# Patient Record
Sex: FEMALE | Race: WHITE | NOT HISPANIC OR LATINO | Employment: OTHER | URBAN - METROPOLITAN AREA
[De-identification: names, ages, dates, MRNs, and addresses within clinical notes are randomized per-mention and may not be internally consistent; named-entity substitution may affect disease eponyms.]

---

## 2021-03-23 ENCOUNTER — OFFICE VISIT (OUTPATIENT)
Dept: ENDOCRINOLOGY | Facility: CLINIC | Age: 56
End: 2021-03-23

## 2021-03-23 VITALS
HEART RATE: 84 BPM | BODY MASS INDEX: 44.41 KG/M2 | WEIGHT: 293 LBS | TEMPERATURE: 97.1 F | HEIGHT: 68 IN | SYSTOLIC BLOOD PRESSURE: 134 MMHG | DIASTOLIC BLOOD PRESSURE: 80 MMHG | OXYGEN SATURATION: 97 %

## 2021-03-23 DIAGNOSIS — E03.9 ACQUIRED HYPOTHYROIDISM: ICD-10-CM

## 2021-03-23 DIAGNOSIS — E11.65 UNCONTROLLED TYPE 2 DIABETES MELLITUS WITH HYPERGLYCEMIA (HCC): Primary | ICD-10-CM

## 2021-03-23 LAB
EXPIRATION DATE: NORMAL
HBA1C MFR BLD: 12.3 %
Lab: NORMAL

## 2021-03-23 PROCEDURE — 99204 OFFICE O/P NEW MOD 45 MIN: CPT | Performed by: INTERNAL MEDICINE

## 2021-03-23 PROCEDURE — 83036 HEMOGLOBIN GLYCOSYLATED A1C: CPT | Performed by: INTERNAL MEDICINE

## 2021-03-23 RX ORDER — THYROID, PORCINE 240 MG/1
TABLET ORAL
COMMUNITY
Start: 2021-02-08

## 2021-03-23 RX ORDER — (INSULIN DEGLUDEC AND LIRAGLUTIDE) 100; 3.6 [IU]/ML; MG/ML
INJECTION, SOLUTION SUBCUTANEOUS
COMMUNITY
Start: 2021-03-10 | End: 2021-03-23 | Stop reason: SDUPTHER

## 2021-03-23 RX ORDER — METFORMIN HYDROCHLORIDE 500 MG/1
500 TABLET, EXTENDED RELEASE ORAL
Qty: 30 TABLET | Refills: 5 | Status: SHIPPED | OUTPATIENT
Start: 2021-03-23

## 2021-03-23 RX ORDER — SPIRONOLACTONE 100 MG/1
TABLET, FILM COATED ORAL
COMMUNITY
Start: 2021-02-08

## 2021-03-23 RX ORDER — REPAGLINIDE 1 MG/1
TABLET ORAL
COMMUNITY
Start: 2021-02-08 | End: 2021-03-23

## 2021-03-23 RX ORDER — (INSULIN DEGLUDEC AND LIRAGLUTIDE) 100; 3.6 [IU]/ML; MG/ML
50 INJECTION, SOLUTION SUBCUTANEOUS DAILY
Qty: 18 ML | Refills: 5 | Status: SHIPPED | OUTPATIENT
Start: 2021-03-23

## 2021-03-23 NOTE — PROGRESS NOTES
"     Office Note      Date: 2021  Patient Name: Mimi Serrano  MRN: [id number]  : 1965    Chief Complaint   Patient presents with   • Diabetes   • Thyroid Problem       History of Present Illness:   Mimi Serrano is a 55 y.o. female who presents for Diabetes - type 2.  She was diagnosed with gestational diabetes 21 years ago but it did not remit after delivery. She was treated with pills.  She took metformin.  She is no longer on metformin ( it made her feel bad). She did try actos as well. Then she tried invokana- yeast infection.  She has been xultophy for 3 years having tried victoza prior to that.  bg checks- daily. Usually high    Last A1c:>12      Subjective      Diabetic Complications:  Eyes: No  Kidneys: No  Feet: No  Heart: No    Diet and Exercise:  Meals per day: 2  Minutes of exercise per week: 50 mins.    Review of Systems:   Review of Systems   Constitutional: Positive for appetite change and fatigue.   HENT: Positive for ear pain.    Eyes: Negative.    Respiratory: Negative.    Cardiovascular: Positive for palpitations.   Gastrointestinal: Negative.    Genitourinary: Negative.    Skin: Negative.    Allergic/Immunologic: Negative.    Neurological: Negative.    Psychiatric/Behavioral: Positive for dysphoric mood and sleep disturbance.       The following portions of the patient's history were reviewed and updated as appropriate: allergies, current medications, past family history, past medical history, past social history, past surgical history and problem list.    Objective     Visit Vitals  /80 (BP Location: Left arm, Patient Position: Sitting, Cuff Size: Adult)   Pulse 84   Temp 97.1 °F (36.2 °C) (Infrared)   Ht 172.7 cm (68\")   Wt (!) 151 kg (332 lb)   SpO2 97%   BMI 50.48 kg/m²       Labs:    CMP  No results found for: GLUCOSE, BUN, CREATININE, EGFRIFNONA, EGFRIFAFRI, BCR, K, CO2, CALCIUM, PROTENTOTREF, LABIL2, BILIRUBIN, AST, ALT     CBC w/DIFF  No results found for: WBC, RBC, " HGB, HCT, MCV, MCH, MCHC, RDW, RDWSD, MPV, PLT, NEUTRORELPCT, LYMPHORELPCT, MONORELPCT, EOSRELPCT, BASORELPCT, AUTOIGPER, NEUTROABS, LYMPHSABS, MONOSABS, EOSABS, BASOSABS, AUTOIGNUM, NRBC    Physical Exam:  Physical Exam  Vitals reviewed.   Constitutional:       Appearance: She is obese.   Neck:      Comments: No goiter  Musculoskeletal:      Right foot: Normal range of motion. No deformity, bunion, Charcot foot, foot drop or prominent metatarsal heads.      Left foot: Normal range of motion. No deformity, bunion, Charcot foot, foot drop or prominent metatarsal heads.   Feet:      Right foot:      Protective Sensation: 10 sites tested. 10 sites sensed.      Left foot:      Protective Sensation: 10 sites tested. 10 sites sensed.      Comments: Diabetic Foot Exam Performed and Monofilament Test Performed  Neurological:      Mental Status: She is alert and oriented to person, place, and time.   Psychiatric:         Mood and Affect: Mood normal.         Thought Content: Thought content normal.         Judgment: Judgment normal.         Assessment / Plan      Assessment & Plan:  Problem List Items Addressed This Visit        Other    Uncontrolled type 2 diabetes mellitus with hyperglycemia (CMS/[facility]) - Primary    Current Assessment & Plan     Very high a1c,   Will stop glinide and add back low dose metformin. Consider bariatric surgery  Recommend ketogenic diet and exercise          Relevant Medications    metFORMIN ER (GLUCOPHAGE-XR) 500 MG 24 hr tablet    Xultophy 100-3.6 UNIT-MG/ML solution pen-injector subcutaneous pen    Other Relevant Orders    POC Glycosylated Hemoglobin (Hb A1C) (Completed)    Acquired hypothyroidism    Current Assessment & Plan     Clinically euthyroid. Will check levles   And adjust as indicated          Relevant Medications    [place] Thyroid 240 MG tablet    Other Relevant Orders    TSH           Demarco Devries MD   03/23/2021

## 2021-03-23 NOTE — ASSESSMENT & PLAN NOTE
Very high a1c,   Will stop glinide and add back low dose metformin. Consider bariatric surgery  Recommend ketogenic diet and exercise

## 2021-04-15 ENCOUNTER — TELEPHONE (OUTPATIENT)
Dept: ENDOCRINOLOGY | Facility: CLINIC | Age: 56
End: 2021-04-15

## 2021-04-15 NOTE — TELEPHONE ENCOUNTER
Patient would like a call about her TSH labs from [employer] to discuss them. However, she would need a call back tomorrow because she's about to go into another appt. Please advise.

## 2023-11-29 ENCOUNTER — OFFICE VISIT (OUTPATIENT)
Dept: ENDOCRINOLOGY | Facility: CLINIC | Age: 58
End: 2023-11-29
Payer: COMMERCIAL

## 2023-11-29 VITALS
OXYGEN SATURATION: 97 % | BODY MASS INDEX: 44.41 KG/M2 | DIASTOLIC BLOOD PRESSURE: 84 MMHG | HEART RATE: 80 BPM | WEIGHT: 293 LBS | HEIGHT: 68 IN | SYSTOLIC BLOOD PRESSURE: 130 MMHG

## 2023-11-29 DIAGNOSIS — E03.9 HYPOTHYROIDISM, UNSPECIFIED TYPE: ICD-10-CM

## 2023-11-29 DIAGNOSIS — E11.65 UNCONTROLLED TYPE 2 DIABETES MELLITUS WITH HYPERGLYCEMIA: Primary | ICD-10-CM

## 2023-11-29 LAB
ALBUMIN UR-MCNC: 3.4 MG/DL
CREAT UR-MCNC: 111.9 MG/DL
EXPIRATION DATE: ABNORMAL
EXPIRATION DATE: ABNORMAL
GLUCOSE BLDC GLUCOMTR-MCNC: 285 MG/DL (ref 70–130)
HBA1C MFR BLD: 11.6 % (ref 4.5–5.7)
Lab: ABNORMAL
Lab: ABNORMAL
MICROALBUMIN/CREAT UR: 30.4 MG/G (ref 0–29)

## 2023-11-29 PROCEDURE — 82043 UR ALBUMIN QUANTITATIVE: CPT | Performed by: INTERNAL MEDICINE

## 2023-11-29 PROCEDURE — 84443 ASSAY THYROID STIM HORMONE: CPT | Performed by: INTERNAL MEDICINE

## 2023-11-29 PROCEDURE — 82570 ASSAY OF URINE CREATININE: CPT | Performed by: INTERNAL MEDICINE

## 2023-11-29 PROCEDURE — 80061 LIPID PANEL: CPT | Performed by: INTERNAL MEDICINE

## 2023-11-29 PROCEDURE — 80053 COMPREHEN METABOLIC PANEL: CPT | Performed by: INTERNAL MEDICINE

## 2023-11-29 PROCEDURE — 84439 ASSAY OF FREE THYROXINE: CPT | Performed by: INTERNAL MEDICINE

## 2023-11-29 RX ORDER — TIRZEPATIDE 2.5 MG/.5ML
2.5 INJECTION, SOLUTION SUBCUTANEOUS WEEKLY
Qty: 2 ML | Refills: 3 | Status: SHIPPED | OUTPATIENT
Start: 2023-11-29

## 2023-11-29 RX ORDER — LEVOTHYROXINE SODIUM 112 UG/1
112 TABLET ORAL DAILY
COMMUNITY
End: 2023-12-01

## 2023-11-29 RX ORDER — GLIPIZIDE 10 MG/1
10 TABLET, FILM COATED, EXTENDED RELEASE ORAL DAILY
COMMUNITY

## 2023-11-29 RX ORDER — INSULIN DEGLUDEC 200 U/ML
INJECTION, SOLUTION SUBCUTANEOUS
Qty: 15 ML | Refills: 5 | Status: SHIPPED | OUTPATIENT
Start: 2023-11-29

## 2023-11-29 NOTE — PROGRESS NOTES
"Chief Complaint   Patient presents with    Hypothyroidism    Diabetes        HPI   Mimi Serrano is a 58 y.o. female had concerns including Hypothyroidism and Diabetes.      Patient previously seen in this office, last in 2021.  Patient reports multiple medication changes since her last visit with this office, primarily due to issues with insurance.    Regarding hypothyroidism, patient changed from [place] Thyroid to levothyroxine given lack of insurance coverage.  Patient reports that her PCP actually stopped her thyroid hormone completely earlier this year due to abnormal labs and TSH shyam to greater than 100.  She was subsequently started on levothyroxine at a dose of 75 mcg daily and this is now been increased to 112 mcg daily.  She has not had repeat labs since this dose change and is agreeable to doing this today.  She does report concern for ongoing weight gain and fatigue.  Patient also reports that she has had an insurance change and is interested in transitioning back to [place] Thyroid.  She denies any personal history of cardiac arrhythmia or heart disease.    Regarding diabetes, patient is currently taking glipizide 10 mg extended release daily and metformin 500 mg extended release daily.  She states that Xultophy was discontinued due to lack of insurance coverage.  Her PCP did do a trial of Ozempic earlier this summer which resulted in projectile vomiting.  Patient reports eye exam is up-to-date from within the past year, she had cataract surgery last Wednesday.      The following portions of the patient's history were reviewed and updated as appropriate: allergies, current medications, and past social history.    Review of Systems   Constitutional:  Positive for fatigue and unexpected weight gain.        /84 (BP Location: Right arm, Patient Position: Sitting, Cuff Size: Adult)   Pulse 80   Ht 172.7 cm (68\")   Wt (!) 139 kg (306 lb)   SpO2 97%   BMI 46.53 kg/m²      Physical Exam    "   Constitutional:  well developed; well nourished  no acute distress  obese - Body mass index is 46.53 kg/m².   ENT/Thyroid: not examined   Eyes: Conjunctiva: clear   Respiratory:  breathing is unlabored  clear to auscultation bilaterally   Cardiovascular:  regular rate and rhythm   Chest:  Not performed.   Abdomen: Not performed.   : Not performed.   Musculoskeletal: Not performed   Skin: not performed.   Neuro: mental status, speech normal   Psych: mood and affect are within normal limits       Labs/Imaging  Glucose:  Lab Results   Component Value Date    POCGLU 285 (A) 11/29/2023     HbA1c:  Lab Results   Component Value Date    HGBA1C 11.6 (A) 11/29/2023    HGBA1C 12.3 03/23/2021     Labs dated 7/21/2023  TSH 8.38  Free T4 0.9        Diagnoses and all orders for this visit:    1. Uncontrolled type 2 diabetes mellitus with hyperglycemia (Primary)  -     POC Glucose, Blood  -     POC Glycosylated Hemoglobin (Hb A1C)  -     Microalbumin / Creatinine Urine Ratio - Urine, Clean Catch; Future  -     Comprehensive Metabolic Panel; Future  -     Lipid Panel; Future  Diabetes is poorly controlled with hemoglobin A1c 11.6%, glucose elevated during office visit  Reviewed potential adverse effects of poorly controlled diabetes.  Patient is at elevated risk of complications.  Discussed options for improving glycemic control.  Recommended initiation of basal insulin given degree of hyperglycemia.  Patient was agreeable.  Plan to start Tresiba or formulary equivalent 14 units daily. Patient advised to check glucose every morning before eating. If glucose consistently above 150, patient instructed to increase by 2 units every 3 days until morning sugar (before eating) is less than 150. Patient advised not to exceed 40 units daily. If patient experiencing hypoglycemia (glucose <70), patient should stop titration and contact clinic.   Patient also reports interest in resuming medication which may aid in weight loss.  She did  not tolerate Ozempic.  She previously took Victoza without issue.  We discussed potential trial of Mounjaro.  Potential adverse effects including nausea, worsening gastric reflux were reviewed.  Discussed risk of pancreatitis and data regarding medullary thyroid cancer.  Patient denies any personal or family history of medullary thyroid cancer.  Start Mounjaro 2.5 mg weekly  Continue glipizide extended release 10 mg daily and metformin 500 mg extended release daily  Discussed potential for long-term complications with uncontrolled diabetes including nephropathy, neuropathy, retinopathy, increased risk for cardiac disease.  Discussed the role of diet and exercise in the management of diabetes.   Updating screening labs today    2. Hypothyroidism, unspecified type  -     TSH; Future  -     T4, Free; Future  Patient is currently taking levothyroxine 112 mcg daily.  She has not had labs following recent dose change.  She does report interest in transition back to [facility].  We discussed potential adverse effects of T3 containing therapy.  We will update labs today and determine appropriate dose conversion.    Other orders  -     Tirzepatide (Mounjaro) 2.5 MG/0.5ML solution pen-injector; Inject 0.5 mL under the skin into the appropriate area as directed 1 (One) Time Per Week.  Dispense: 2 mL; Refill: 3  -     Insulin Degludec (Tresiba FlexTouch) 200 UNIT/ML solution pen-injector pen injection; Start 14 units daily and titrate per instructions, MDD 40 units  Dispense: 15 mL; Refill: 5         No follow-ups on file. The patient was instructed to contact the clinic with any interval questions or concerns.    Electronically signed by: Kristen Krause MD   Endocrinologist    Dictated Utilizing Dragon Dictation

## 2023-11-30 ENCOUNTER — PRIOR AUTHORIZATION (OUTPATIENT)
Dept: ENDOCRINOLOGY | Facility: CLINIC | Age: 58
End: 2023-11-30
Payer: COMMERCIAL

## 2023-11-30 LAB
ALBUMIN SERPL-MCNC: 4.1 G/DL (ref 3.5–5.2)
ALBUMIN/GLOB SERPL: 1.4 G/DL
ALP SERPL-CCNC: 126 U/L (ref 39–117)
ALT SERPL W P-5'-P-CCNC: 50 U/L (ref 1–33)
ANION GAP SERPL CALCULATED.3IONS-SCNC: 12.1 MMOL/L (ref 5–15)
AST SERPL-CCNC: 46 U/L (ref 1–32)
BILIRUB SERPL-MCNC: 1.1 MG/DL (ref 0–1.2)
BUN SERPL-MCNC: 19 MG/DL (ref 6–20)
BUN/CREAT SERPL: 18.1 (ref 7–25)
CALCIUM SPEC-SCNC: 9.5 MG/DL (ref 8.6–10.5)
CHLORIDE SERPL-SCNC: 101 MMOL/L (ref 98–107)
CHOLEST SERPL-MCNC: 202 MG/DL (ref 0–200)
CO2 SERPL-SCNC: 25.9 MMOL/L (ref 22–29)
CREAT SERPL-MCNC: 1.05 MG/DL (ref 0.57–1)
EGFRCR SERPLBLD CKD-EPI 2021: 62.5 ML/MIN/1.73
GLOBULIN UR ELPH-MCNC: 2.9 GM/DL
GLUCOSE SERPL-MCNC: 265 MG/DL (ref 65–99)
HDLC SERPL-MCNC: 54 MG/DL (ref 40–60)
LDLC SERPL CALC-MCNC: 121 MG/DL (ref 0–100)
LDLC/HDLC SERPL: 2.17 {RATIO}
POTASSIUM SERPL-SCNC: 4.8 MMOL/L (ref 3.5–5.2)
PROT SERPL-MCNC: 7 G/DL (ref 6–8.5)
SODIUM SERPL-SCNC: 139 MMOL/L (ref 136–145)
T4 FREE SERPL-MCNC: 1.05 NG/DL (ref 0.93–1.7)
TRIGL SERPL-MCNC: 155 MG/DL (ref 0–150)
TSH SERPL DL<=0.05 MIU/L-ACNC: 7.11 UIU/ML (ref 0.27–4.2)
VLDLC SERPL-MCNC: 27 MG/DL (ref 5–40)

## 2023-11-30 NOTE — TELEPHONE ENCOUNTER
FRANCO JOE (Key: [id number])  Rx #: [id number]  Need Help? Call us at [phone]  Outcome Additional Information Required  Your PA has been resolved, no additional PA is required. For further inquiries please contact the number on the back of the member prescription card. (Message [phone])  Drug Mounjaro 2.5MG/0.5ML pen-injectors ePA cloud logo  Form [facility] Electronic PA Form (2017 NCPDP)  Original Claim Info 39

## 2023-12-01 RX ORDER — THYROID 90 MG/1
90 TABLET ORAL DAILY
Qty: 30 TABLET | Refills: 3 | Status: SHIPPED | OUTPATIENT
Start: 2023-12-01 | End: 2024-11-30

## 2023-12-06 ENCOUNTER — PRIOR AUTHORIZATION (OUTPATIENT)
Dept: ENDOCRINOLOGY | Facility: CLINIC | Age: 58
End: 2023-12-06
Payer: COMMERCIAL

## 2023-12-06 NOTE — TELEPHONE ENCOUNTER
Please contact patient.  [place] Thyroid is not covered by her insurance.  Please let me know how she would like to move forward.  It does look like her insurance may cover synthetic T3 (liothyronine) we could try this in combination with levothyroxine.  Example of this regimen would be 112 mcg of levothyroxine and 5 mcg of levothyroxine.  Alternatively, we could increase her dose of levothyroxine.

## 2023-12-06 NOTE — TELEPHONE ENCOUNTER
FRANCO JOE (Key: [id number])  PA Case ID #: [id number]  Rx #: [id number]  Need Help? Call us at [phone]  Outcome Denied today  Your PA request has been denied. Additional information will be provided in the denial communication. (Message [phone])  Drug [place] Thyroid 90MG tablets  Form [employer] Electronic PA Form (2017 NCPDP)  Original Claim Info 70,MR    You do not meet the requirements of your plan.  Your plan covers this drug when your doctor provides documentation that you meet any of  these conditions:  -You have a clinical condition for which there is no appropriate formulary alternative  -You need a form of the drug that is not on the formulary  -You tried the required number of preferred formulary alternatives on your formulary first.  These drugs did not work for you or you cannot take them.  Your request has been denied based on the information we have.  Formulary alternative(s) are levothyroxine tablet, liothyronine. Requirement: 3 in a class  with 3 or more alternatives, 2 in a class with 2 alternatives, or 1 in a class with only 1  alternative. Please refer to your plan documents for a complete list of alternatives.  Note: Formulary alternatives may require a prior authorization. Your prescriber will be  responsible for determining what alternative is appropriate for you.    Please advise

## 2023-12-07 NOTE — TELEPHONE ENCOUNTER
Called the pt and told her the armour was denied. She is going to the phar and verify they have the correct ins info.    She is going to call back with what MM needs to do.    She verbalized understanding to the denial.    Also went over the labs results. She verbalized understanding to those.

## 2023-12-08 ENCOUNTER — TELEPHONE (OUTPATIENT)
Dept: ENDOCRINOLOGY | Facility: CLINIC | Age: 58
End: 2023-12-08

## 2024-01-09 ENCOUNTER — PRIOR AUTHORIZATION (OUTPATIENT)
Dept: ENDOCRINOLOGY | Facility: CLINIC | Age: 59
End: 2024-01-09
Payer: COMMERCIAL

## 2024-02-07 ENCOUNTER — TELEPHONE (OUTPATIENT)
Dept: ENDOCRINOLOGY | Facility: CLINIC | Age: 59
End: 2024-02-07
Payer: COMMERCIAL

## 2024-02-07 NOTE — TELEPHONE ENCOUNTER
Caller: Mimi Serrano    Relationship to patient: Self    Best call back number: [phone]    Patient is needing: PATIENT WAS GIVEN MOUNJARO WITH LOWEST DOSE, SHE IS NEEDING TO RAISE DOSAGE AND GET NEW PRESCRIPTION FOR THAT     [facility] - [address] - [phone] [facility] [phone] FX [[id number]]

## 2024-02-08 NOTE — TELEPHONE ENCOUNTER
Called the and she is wanting to increase the Mounjaro. She is not having any issues with the low dose.    Script is pending.    Last office visit with prescribing clinician: 11/29/2023       Next office visit with prescribing clinician: 5/1/2024     {

## 2024-05-01 ENCOUNTER — OFFICE VISIT (OUTPATIENT)
Dept: ENDOCRINOLOGY | Facility: CLINIC | Age: 59
End: 2024-05-01
Payer: COMMERCIAL

## 2024-05-01 VITALS
OXYGEN SATURATION: 97 % | DIASTOLIC BLOOD PRESSURE: 80 MMHG | HEART RATE: 72 BPM | HEIGHT: 68 IN | WEIGHT: 293 LBS | BODY MASS INDEX: 44.41 KG/M2 | SYSTOLIC BLOOD PRESSURE: 130 MMHG

## 2024-05-01 DIAGNOSIS — E03.9 HYPOTHYROIDISM, UNSPECIFIED TYPE: ICD-10-CM

## 2024-05-01 DIAGNOSIS — E11.65 UNCONTROLLED TYPE 2 DIABETES MELLITUS WITH HYPERGLYCEMIA: Primary | ICD-10-CM

## 2024-05-01 LAB
EXPIRATION DATE: ABNORMAL
EXPIRATION DATE: ABNORMAL
GLUCOSE BLDC GLUCOMTR-MCNC: 270 MG/DL (ref 70–130)
HBA1C MFR BLD: 9.4 % (ref 4.5–5.7)
Lab: ABNORMAL
Lab: ABNORMAL
T3FREE SERPL-MCNC: 2.61 PG/ML (ref 2–4.4)
T4 FREE SERPL-MCNC: 0.74 NG/DL (ref 0.93–1.7)
TSH SERPL DL<=0.05 MIU/L-ACNC: 12.4 UIU/ML (ref 0.27–4.2)

## 2024-05-01 PROCEDURE — 84443 ASSAY THYROID STIM HORMONE: CPT | Performed by: INTERNAL MEDICINE

## 2024-05-01 PROCEDURE — 83036 HEMOGLOBIN GLYCOSYLATED A1C: CPT | Performed by: INTERNAL MEDICINE

## 2024-05-01 PROCEDURE — 99214 OFFICE O/P EST MOD 30 MIN: CPT | Performed by: INTERNAL MEDICINE

## 2024-05-01 PROCEDURE — 84481 FREE ASSAY (FT-3): CPT | Performed by: INTERNAL MEDICINE

## 2024-05-01 PROCEDURE — 84439 ASSAY OF FREE THYROXINE: CPT | Performed by: INTERNAL MEDICINE

## 2024-05-01 PROCEDURE — 82947 ASSAY GLUCOSE BLOOD QUANT: CPT | Performed by: INTERNAL MEDICINE

## 2024-05-01 RX ORDER — THYROID 120 MG/1
120 TABLET ORAL DAILY
COMMUNITY
End: 2024-05-02 | Stop reason: SDUPTHER

## 2024-05-01 RX ORDER — INSULIN DEGLUDEC 200 U/ML
INJECTION, SOLUTION SUBCUTANEOUS
Qty: 15 ML | Refills: 5 | Status: SHIPPED | OUTPATIENT
Start: 2024-05-01

## 2024-05-01 RX ORDER — LIRAGLUTIDE 6 MG/ML
0.6 INJECTION SUBCUTANEOUS DAILY
Qty: 3 ML | Refills: 5 | Status: SHIPPED | OUTPATIENT
Start: 2024-05-01

## 2024-05-01 NOTE — PROGRESS NOTES
"Chief Complaint   Patient presents with    Diabetes        HPI   Mimi Serrano is a 58 y.o. female had concerns including Diabetes.      Patient reports that her primary care physician increased her [place] Thyroid to 120 mg daily approximately 2 months ago.  She has not yet had repeat labs.  She does report rare palpitations.    Patient is no longer taking Mounjaro for diabetes.  She reports adverse effect of the 5 mg dose including constipation prompting discontinuation.  She did not have these effects at the lower dose but does not want to resume this medication.  She continues on glipizide extended release 10 mg daily, Tresiba 30 units daily, metformin 500 mg extended release daily. She has been at current dose for approximately 1 month. She reports glucose values generally around 150 in the morning.  She does not generally monitor blood glucose later in the day. Patient does not have home glucose data available for review.  She does report that she previously tolerated both Xultophy and Victoza without adverse effect.  She would like to go back on 1 of these medications due to desire for appetite suppression.    The following portions of the patient's history were reviewed and updated as appropriate: allergies, current medications, and past social history.    Review of Systems   ROS was reviewed and verified. All other ROS negative.    /80 (BP Location: Left arm, Patient Position: Sitting, Cuff Size: Adult)   Pulse 72   Ht 172.7 cm (68\")   Wt (!) 137 kg (301 lb)   SpO2 97%   BMI 45.77 kg/m²      Physical Exam      Constitutional:  well developed; well nourished  no acute distress  obese - Body mass index is 45.77 kg/m².   ENT/Thyroid: not examined   Eyes: EOM intact  Conjunctiva: clear   Respiratory:  breathing is unlabored  clear to auscultation bilaterally   Cardiovascular:  regular rate and rhythm   Chest:  Not performed.   Abdomen: Not performed.   : Not performed.   Musculoskeletal: Not " performed   Skin: not performed.   Neuro: mental status, speech normal   Psych: mood and affect are within normal limits       Labs/Imaging  Glucose:  Lab Results   Component Value Date    POCGLU 270 (A) 05/01/2024     HbA1c:  Lab Results   Component Value Date    HGBA1C 9.4 (A) 05/01/2024    HGBA1C 11.6 (A) 11/29/2023     Microalbumin:  Lab Results   Component Value Date    MICROALBUR 3.4 11/29/2023     Lipid Panel:  Lab Results   Component Value Date    CHOL 202 (H) 11/29/2023    TRIG 155 (H) 11/29/2023    HDL 54 11/29/2023     (H) 11/29/2023    VLDL 27 11/29/2023    LDLHDL 2.17 11/29/2023     CMP:  Lab Results   Component Value Date    BUN 19 11/29/2023    CREATININE 1.05 (H) 11/29/2023    BCR 18.1 11/29/2023     11/29/2023    K 4.8 11/29/2023    CO2 25.9 11/29/2023    CALCIUM 9.5 11/29/2023    ALBUMIN 4.1 11/29/2023    BILITOT 1.1 11/29/2023    ALKPHOS 126 (H) 11/29/2023    AST 46 (H) 11/29/2023    ALT 50 (H) 11/29/2023        Diagnoses and all orders for this visit:    1. Uncontrolled type 2 diabetes mellitus with hyperglycemia (Primary)  -     POC Glucose, Blood  -     POC Glycosylated Hemoglobin (Hb A1C)  Diabetes is poorly controlled hemoglobin A1c 9.4%.  This is significantly improved from prior testing.  Patient is hyperglycemic during visit today.  No home glucose data is available for review though patient reports fasting sugars near goal.  Patient desires repeat trial of GLP-1 in the form of either liraglutide or combinations of xultrophy.  Continue Tresiba 30 units daily  Start Victoza 0.6 mg daily.  Potential adverse effects were reviewed.  Continue glipizide 10 mg extended release daily.  Discussed that if patient does not tolerate readdition of GLP-1 increasing this would be another option to improve daytime coverage.  Continue metformin 500 mg extended release daily.  Patient does report she previously took a higher dose of this medication and is unsure why it was reduced.  Defer  change today but could consider increase in the future, if needed.  Discussed goals for glycemic control and recommendations for glucose monitoring.  Recommended monitoring at a minimum twice daily.  Discussed potential negative impact of poorly controlled diabetes.  Patient is currently at elevated risk of complications due to poor glucose control.  Screening labs are up-to-date from November 2023    2. Hypothyroidism, unspecified type  -     TSH; Future  -     T4, Free; Future  -     T3, Free; Future  Patient transitioned to [place] Thyroid from levothyroxine following last visit.  Patient had interval dose increase approximately 2 months ago to 120 mg daily by PCP.  She has not yet had repeat labs.  She does report occasional palpitations.  We did discuss that this could be due to T3 component of [place] Thyroid.  Patient voiced understanding.  Plan to repeat labs today and adjust medication as clinically indicated.    Other orders  -     Liraglutide (Victoza) 18 MG/3ML solution pen-injector injection; Inject 0.6 mg under the skin into the appropriate area as directed Daily.  Dispense: 3 mL; Refill: 5       Return in about 3 months (around 8/1/2024) for Next scheduled follow up. The patient was instructed to contact the clinic with any interval questions or concerns.    Electronically signed by: Kristen Krause MD   Endocrinologist    Dictated Utilizing Dragon Dictation

## 2024-05-02 ENCOUNTER — PRIOR AUTHORIZATION (OUTPATIENT)
Dept: ENDOCRINOLOGY | Facility: CLINIC | Age: 59
End: 2024-05-02
Payer: COMMERCIAL

## 2024-05-02 RX ORDER — THYROID 120 MG/1
120 TABLET ORAL DAILY
Qty: 90 TABLET | Refills: 1 | Status: SHIPPED | OUTPATIENT
Start: 2024-05-02

## 2024-05-02 RX ORDER — THYROID 30 MG/1
30 TABLET ORAL DAILY
Qty: 90 TABLET | Refills: 1 | Status: SHIPPED | OUTPATIENT
Start: 2024-05-02 | End: 2025-05-02

## 2024-05-07 ENCOUNTER — PRIOR AUTHORIZATION (OUTPATIENT)
Dept: ENDOCRINOLOGY | Facility: CLINIC | Age: 59
End: 2024-05-07
Payer: COMMERCIAL

## 2024-05-07 RX ORDER — GLIPIZIDE 10 MG/1
10 TABLET, FILM COATED, EXTENDED RELEASE ORAL DAILY
Qty: 30 TABLET | Refills: 1 | Status: SHIPPED | OUTPATIENT
Start: 2024-05-07

## 2024-05-10 ENCOUNTER — TELEPHONE (OUTPATIENT)
Dept: ENDOCRINOLOGY | Facility: CLINIC | Age: 59
End: 2024-05-10

## 2024-07-10 ENCOUNTER — TELEPHONE (OUTPATIENT)
Dept: ENDOCRINOLOGY | Facility: CLINIC | Age: 59
End: 2024-07-10
Payer: COMMERCIAL

## 2024-07-10 NOTE — TELEPHONE ENCOUNTER
I received a request for OV, med list etc. from [facility]. The paper has a fax number but no phone number.    I called the pt and she stated she did not anything about this place. She said to disregard the request and send nothing.    I told her I would scan paper into her chart just in case we need to address at a later time.    She verbalized understanding.

## 2024-07-16 RX ORDER — THYROID 120 MG/1
120 TABLET ORAL DAILY
Qty: 90 TABLET | Refills: 1 | Status: SHIPPED | OUTPATIENT
Start: 2024-07-16

## 2024-07-16 RX ORDER — THYROID 30 MG/1
30 TABLET ORAL DAILY
Qty: 90 TABLET | Refills: 1 | Status: SHIPPED | OUTPATIENT
Start: 2024-07-16 | End: 2025-07-16

## 2024-07-16 NOTE — TELEPHONE ENCOUNTER
Caller: FRANCO JOE    Relationship: SELF    Best call back number: [phone]    Requested Prescriptions:   Requested Prescriptions     Pending Prescriptions Disp Refills    Thyroid 120 MG PO tablet 90 tablet 1     Sig: Take 1 tablet by mouth Daily. Take with 30 mg tablet for total of 150 mg.    Thyroid 30 MG PO tablet 90 tablet 1     Sig: Take 1 tablet by mouth Daily. Take with 120 mg tablet for total of 150 mg daily.        Pharmacy where request should be sent: [address] [phone] [facility] [phone]      Last office visit with prescribing clinician: 5/1/2024   Last telemedicine visit with prescribing clinician: Visit date not found   Next office visit with prescribing clinician: 11/27/2024     Additional details provided by patient:     Does the patient have less than a 3 day supply:  [] Yes  [] No    Would you like a call back once the refill request has been completed: [] Yes [] No    If the office needs to give you a call back, can they leave a voicemail: [] Yes [] No    Summer Hartman Rep   07/16/24 16:09 EDT

## 2024-09-04 ENCOUNTER — TELEPHONE (OUTPATIENT)
Dept: ENDOCRINOLOGY | Facility: CLINIC | Age: 59
End: 2024-09-04
Payer: COMMERCIAL

## 2024-09-06 NOTE — TELEPHONE ENCOUNTER
Was able to reach patient to get her rescheduled. She does not want to schedule and advised that she will start seeing her pcp for her diabetes management since it is too hard to get consistent follow up appointments at our office. Advised that we will refill her meds if needed until she is seen by pcp. She voiced understanding.

## 2024-09-25 ENCOUNTER — TELEPHONE (OUTPATIENT)
Dept: ENDOCRINOLOGY | Facility: CLINIC | Age: 59
End: 2024-09-25
Payer: COMMERCIAL

## 2024-11-21 NOTE — TELEPHONE ENCOUNTER
Pa submitted via cm for VICTOZA   Initiate Treatment: ivermectin 1 % topical cream \\nSig: Apply pea sized amount topically to the affected areas on the face once daily as directed. Detail Level: Zone Discontinue Regimen: metronidazole 1 % topical gel \\nSig: Apply topically to the affected areas on the face once daily. Render In Strict Bullet Format?: No